# Patient Record
Sex: FEMALE | Race: OTHER | URBAN - METROPOLITAN AREA
[De-identification: names, ages, dates, MRNs, and addresses within clinical notes are randomized per-mention and may not be internally consistent; named-entity substitution may affect disease eponyms.]

---

## 2018-07-19 ENCOUNTER — OUTPATIENT (OUTPATIENT)
Dept: OUTPATIENT SERVICES | Facility: HOSPITAL | Age: 23
LOS: 1 days | Discharge: HOME | End: 2018-07-19

## 2018-07-19 DIAGNOSIS — Z00.00 ENCOUNTER FOR GENERAL ADULT MEDICAL EXAMINATION WITHOUT ABNORMAL FINDINGS: ICD-10-CM

## 2018-07-19 DIAGNOSIS — R19.7 DIARRHEA, UNSPECIFIED: ICD-10-CM

## 2020-05-08 ENCOUNTER — APPOINTMENT (OUTPATIENT)
Dept: ULTRASOUND IMAGING | Age: 25
End: 2020-05-08
Attending: EMERGENCY MEDICINE
Payer: OTHER GOVERNMENT

## 2020-05-08 ENCOUNTER — HOSPITAL ENCOUNTER (EMERGENCY)
Age: 25
Discharge: HOME | End: 2020-05-08
Attending: EMERGENCY MEDICINE
Payer: OTHER GOVERNMENT

## 2020-05-08 VITALS
DIASTOLIC BLOOD PRESSURE: 79 MMHG | RESPIRATION RATE: 14 BRPM | SYSTOLIC BLOOD PRESSURE: 126 MMHG | OXYGEN SATURATION: 99 % | HEART RATE: 67 BPM | HEIGHT: 62 IN | BODY MASS INDEX: 27.6 KG/M2 | WEIGHT: 150 LBS | TEMPERATURE: 98.1 F

## 2020-05-08 DIAGNOSIS — Z34.90 EARLY STAGE OF PREGNANCY: Primary | ICD-10-CM

## 2020-05-08 DIAGNOSIS — R10.84 ABDOMINAL PAIN, GENERALIZED: ICD-10-CM

## 2020-05-08 LAB
ABO + RH BLD: NORMAL
ALBUMIN SERPL-MCNC: 4.8 G/DL (ref 3.4–5)
ALBUMIN/GLOB SERPL: 1.2 {RATIO} (ref 0.8–1.7)
ALP SERPL-CCNC: 51 U/L (ref 45–117)
ALT SERPL-CCNC: 18 U/L (ref 13–56)
ANION GAP SERPL CALC-SCNC: 5 MMOL/L (ref 3–18)
APPEARANCE UR: CLEAR
AST SERPL-CCNC: 16 U/L (ref 10–38)
BACTERIA URNS QL MICRO: ABNORMAL /HPF
BASOPHILS # BLD: 0 K/UL (ref 0–0.1)
BASOPHILS NFR BLD: 1 % (ref 0–2)
BILIRUB SERPL-MCNC: 0.8 MG/DL (ref 0.2–1)
BILIRUB UR QL: NEGATIVE
BUN SERPL-MCNC: 9 MG/DL (ref 7–18)
BUN/CREAT SERPL: 12 (ref 12–20)
CALCIUM SERPL-MCNC: 9.2 MG/DL (ref 8.5–10.1)
CHLORIDE SERPL-SCNC: 106 MMOL/L (ref 100–111)
CO2 SERPL-SCNC: 25 MMOL/L (ref 21–32)
COLOR UR: ABNORMAL
CREAT SERPL-MCNC: 0.74 MG/DL (ref 0.6–1.3)
DIFFERENTIAL METHOD BLD: ABNORMAL
EOSINOPHIL # BLD: 0.1 K/UL (ref 0–0.4)
EOSINOPHIL NFR BLD: 1 % (ref 0–5)
EPITH CASTS URNS QL MICRO: ABNORMAL /LPF (ref 0–5)
ERYTHROCYTE [DISTWIDTH] IN BLOOD BY AUTOMATED COUNT: 13.1 % (ref 11.6–14.5)
GLOBULIN SER CALC-MCNC: 4 G/DL (ref 2–4)
GLUCOSE SERPL-MCNC: 82 MG/DL (ref 74–99)
GLUCOSE UR STRIP.AUTO-MCNC: NEGATIVE MG/DL
HCG SERPL-ACNC: 1965 MIU/ML (ref 0–10)
HCT VFR BLD AUTO: 38.4 % (ref 35–45)
HGB BLD-MCNC: 12.3 G/DL (ref 12–16)
HGB UR QL STRIP: NEGATIVE
KETONES UR QL STRIP.AUTO: 15 MG/DL
LEUKOCYTE ESTERASE UR QL STRIP.AUTO: NEGATIVE
LYMPHOCYTES # BLD: 2.5 K/UL (ref 0.9–3.6)
LYMPHOCYTES NFR BLD: 47 % (ref 21–52)
MCH RBC QN AUTO: 26.9 PG (ref 24–34)
MCHC RBC AUTO-ENTMCNC: 32 G/DL (ref 31–37)
MCV RBC AUTO: 83.8 FL (ref 74–97)
MONOCYTES # BLD: 0.3 K/UL (ref 0.05–1.2)
MONOCYTES NFR BLD: 6 % (ref 3–10)
MUCOUS THREADS URNS QL MICRO: ABNORMAL /LPF
NEUTS SEG # BLD: 2.3 K/UL (ref 1.8–8)
NEUTS SEG NFR BLD: 45 % (ref 40–73)
NITRITE UR QL STRIP.AUTO: NEGATIVE
PH UR STRIP: 5.5 [PH] (ref 5–8)
PLATELET # BLD AUTO: 261 K/UL (ref 135–420)
PMV BLD AUTO: 12.9 FL (ref 9.2–11.8)
POTASSIUM SERPL-SCNC: 3.5 MMOL/L (ref 3.5–5.5)
PROT SERPL-MCNC: 8.8 G/DL (ref 6.4–8.2)
PROT UR STRIP-MCNC: 30 MG/DL
RBC # BLD AUTO: 4.58 M/UL (ref 4.2–5.3)
RBC #/AREA URNS HPF: NEGATIVE /HPF (ref 0–5)
SODIUM SERPL-SCNC: 136 MMOL/L (ref 136–145)
SP GR UR REFRACTOMETRY: >1.03 (ref 1–1.03)
UROBILINOGEN UR QL STRIP.AUTO: 1 EU/DL (ref 0.2–1)
WBC # BLD AUTO: 5.2 K/UL (ref 4.6–13.2)
WBC URNS QL MICRO: NEGATIVE /HPF (ref 0–4)

## 2020-05-08 PROCEDURE — 80053 COMPREHEN METABOLIC PANEL: CPT

## 2020-05-08 PROCEDURE — 86900 BLOOD TYPING SEROLOGIC ABO: CPT

## 2020-05-08 PROCEDURE — 99284 EMERGENCY DEPT VISIT MOD MDM: CPT

## 2020-05-08 PROCEDURE — 76817 TRANSVAGINAL US OBSTETRIC: CPT

## 2020-05-08 PROCEDURE — 81001 URINALYSIS AUTO W/SCOPE: CPT

## 2020-05-08 PROCEDURE — 74011250636 HC RX REV CODE- 250/636: Performed by: EMERGENCY MEDICINE

## 2020-05-08 PROCEDURE — 85025 COMPLETE CBC W/AUTO DIFF WBC: CPT

## 2020-05-08 PROCEDURE — 84702 CHORIONIC GONADOTROPIN TEST: CPT

## 2020-05-08 RX ADMIN — SODIUM CHLORIDE 1000 ML: 900 INJECTION, SOLUTION INTRAVENOUS at 22:01

## 2020-05-08 RX ADMIN — SODIUM CHLORIDE 1000 ML: 900 INJECTION, SOLUTION INTRAVENOUS at 19:50

## 2020-05-08 NOTE — ED NOTES
Assumed care of pt. Pt c/o lower abd cramping x 1 month, states she is approx 4 wks pregnant, had pregnancy confirmed via blood draw at urgent care, has not yet seen OB or had IUP. Pt states constant N/V, not just in the mornings. Did not have this issue with previous pregnancy. States clear thick discharge with no smell or itching, denies vaginal bleeding.

## 2020-05-08 NOTE — ED PROVIDER NOTES
[address] [facility]  EMERGENCY DEPARTMENT HISTORY AND PHYSICAL EXAM       Date: 5/8/2020   Patient Name: Elissa Marcelino   YOB: 1995  Medical Record Number: [id number]    HISTORY OF PRESENTING ILLNESS:     Elissa Marcelino is a 30 y.o. female presenting with the noted PMH to the ED c/o abdominal pain. Patient states it started a week ago. Constant. Periumbilical.  With no increasing or decreasing factors. She states she found out 2 weeks ago she was pregnant. Last menstrual period is April 1. She states she was pregnant with twins 4 years ago. No problems with that pregnancy. She denies any vaginal bleeding or discharge. She reports nausea but no vomiting. Denies any fevers or chills. Denies any cough or cold symptoms. Denies any chest pain or shortness of breath. Denies any urinary changes. She reports having 2 bowel movements daily. Rest of 10 systems reviewed and negative. Primary Care Provider: Joann Platt, Not On File   Specialist:    Past Medical History:   History reviewed. No pertinent past medical history. Past Surgical History:   History reviewed. No pertinent surgical history. Social History:   Social History     Tobacco Use    Smoking status: Never Smoker    Smokeless tobacco: Never Used   Substance Use Topics    Alcohol use: Not on file    Drug use: Not on file        Allergies:   No Known Allergies     REVIEW OF SYSTEMS:  Review of Systems      PHYSICAL EXAM:  Vitals:    05/08/20 1903   BP: 126/79   Pulse: 67   Resp: 14   Temp: 98.1 °F (36.7 °C)   SpO2: 99%   Weight: 68 kg (150 lb)   Height: 5' 2\" (1.575 m)       Physical Exam   Vital signs reviewed. Alert oriented x 3 in NAD. HEENT: normocephalic atraumatic. Eyes are PERRLA EOMI. Conjunctiva normal.    External ears and nose normal.    Neck: normal external exam. No midline neck or back TTP. Lungs are clear to ascultation bilaterally.   normal effort  Heart is regular rate and rhythm with no murmurs. Abdomen soft and nontender. No rebound rigidity or guarding. Extremities: Moves all 4 extremities and no distress. Full range of motion. 2+ pulses and BCR in all 4 extremities. Neuro: Normal gait. 5 out of 5 strength in all 4 extremities. No facial droop. Skin examination: intact. no rashes. No petechia or purpura. Medications   sodium chloride 0.9 % bolus infusion 1,000 mL (has no administration in time range)   sodium chloride 0.9 % bolus infusion 1,000 mL (1,000 mL IntraVENous New Bag 5/8/20 1950)       RESULTS:    Labs -   Labs Reviewed   CBC WITH AUTOMATED DIFF - Abnormal; Notable for the following components:       Result Value    MPV 12.9 (*)     All other components within normal limits   BETA HCG, QT - Abnormal; Notable for the following components:    Beta HCG, QT 1,965 (*)     All other components within normal limits   METABOLIC PANEL, COMPREHENSIVE - Abnormal; Notable for the following components:    Protein, total 8.8 (*)     All other components within normal limits   URINALYSIS W/ RFLX MICROSCOPIC - Abnormal; Notable for the following components:    Specific gravity >1.030 (*)     Protein 30 (*)     Ketone 15 (*)     All other components within normal limits   URINE MICROSCOPIC ONLY - Abnormal; Notable for the following components:    Bacteria 1+ (*)     Mucus 2+ (*)     All other components within normal limits   BLOOD TYPE, (ABO+RH)       Radiologic Studies -  No results found. MEDICAL DECISION MAKING    2030. Patient reassessed. Currently in ultrasound.  2101. Patient reassessed. Still in ultrasound. Patient has no new complaints, changes, or physical findings. Results were reviewed with the patient. Pt's questions and concerns were addressed. Care plan was outlined, including follow-up with PCP/specialist and return precautions were discussed. Patient is felt to be stable for discharge at this time. Diagnosis   Clinical Impression:   1.  Early stage of pregnancy    2. Abdominal pain, generalized           Currently  in stable and improved condition. This chart was completed using Dragon, a dictation transcription service. Errors may have resulted from using this device. 2158. Patient signed up with Dr. Thaddeus Saavedra. Currently waiting for ultrasound read. Patient states she only drank a little bit of tea today. Discussed with her about increasing her p.o. intake. She is very dehydrated. Reason for the specific gravity and protein in her urine. Ultrasound still pending. Patient aware. Dr. Thaddeus Saavedra will follow-up.

## 2020-05-08 NOTE — ED TRIAGE NOTES
4 weeks pregnant, verified by bloodwork at [facility]. Called EMS for c/o abdominal pain. Denies bleeding.

## 2020-05-09 NOTE — ED NOTES
10:36 PM signed out to me at 10 PM signout, patient with early pregnancy nonspecific abdominal pain benign labs nausea and avoiding food and drink, was treated for dehydration. Pending is her ultrasound to localize the pregnancy. Ultrasound read by Dr. Enrico Kirby, intrauterine saclike structure with no yolk sac or fetal pole  Right ovarian hypodensity with peripheral vascularity likely representing a right corpus luteum  Trace amount of nonspecific free fluid in the right adnexa    I discussed the findings with the patient, follow-up beta-hCG in 48 hours either through OB or here. Discussed presence of gestational sac, does not define the location of the pregnancy and she will require follow-up ultrasound to definitively localize the pregnancy. She is happy with this plan. Benign and nontoxic appearance and no abdominal tenderness when I examined her. Encounter Diagnoses     ICD-10-CM ICD-9-CM   1. Early stage of pregnancy Z34.90 V22.2   2.  Abdominal pain, generalized R10.84 789.07

## 2020-05-09 NOTE — DISCHARGE INSTRUCTIONS
Patient Education     Belly Pain in Pregnancy: Care Instructions  Your Care Instructions  When you're pregnant, any belly pain can be a worry. You may not want to call your doctor about every pain you have. But you don't want to miss something that is dangerous for you or your baby. Even if it feels familiar, belly pain can mean something new when you're pregnant. It's important to know when to call your doctor. It will also help to know how to care for yourself at home when your pain is not caused by anything harmful. · When belly pain is more severe or constant, see a doctor right away. · If you're sure your belly pain is a sign of labor, call your doctor. · When belly pain is brief, it's usually a normal part of pregnancy. It might be related to changes in the growing uterus. Or it could be the stretching of ligaments called round ligaments. These ligaments help support the uterus. Round ligament pain can be on either side of your belly. It can also be felt in your hips or groin. Follow-up care is a key part of your treatment and safety. Be sure to make and go to all appointments, and call your doctor if you are having problems. It's also a good idea to know your test results and keep a list of the medicines you take. How can you tell if belly pain is a sign of labor? When belly pain is caused by labor, it can feel like mild or menstrual-like cramps in your lower belly. These cramps are probably contractions. They can happen in your second or third trimester. You may also have:  · A steady, dull ache in your lower back, pelvis, or thighs. · A feeling of pressure in your pelvis or lower belly. · Changes in your vaginal discharge or a sudden release of fluid from the vagina. If you think you are in labor, call your doctor. How can you care for yourself at home? When belly pain is mild and is not a symptom of labor:  · Rest until you feel better. · Take a warm bath.   · Think about what you drink and eat:  ¨ Drink plenty of fluids. Choose water and other caffeine-free clear liquids until you feel better. ¨ Try eating small, frequent meals. If your stomach is upset, try bland, low-fat foods like plain rice, broiled chicken, toast, and yogurt. · Think about how you move if you are having brief pains from stretching of the round ligaments. ¨ Try gentle stretching. ¨ Move a little more slowly when turning in bed or getting up from a chair, so those ligaments don't stretch quickly. ¨ Lean forward a bit if you think you are going to cough or sneeze. When should you call for help? Call 911 anytime you think you may need emergency care. For example, call if:  · You have sudden, severe pain in your belly. · You have severe vaginal bleeding. Call your doctor now or seek immediate medical care if:  · You have new or worse belly pain or cramping. · You have any vaginal bleeding. · You have a fever. · You have symptoms of preeclampsia, such as:  ¨ Sudden swelling of your face, hands, or feet. ¨ New vision problems (such as dimness or blurring). ¨ A severe headache. · You think that you may be in labor. This means that you've had at least 8 contractions within 1 hour or at least 4 contractions within 20 minutes, even after you change your position and drink fluids. · You have symptoms of a urinary tract infection. These may include:  ¨ Pain or burning when you urinate. ¨ A frequent need to urinate without being able to pass much urine. ¨ Pain in the flank, which is just below the rib cage and above the waist on either side of the back. ¨ Blood in your urine. Watch closely for changes in your health, and be sure to contact your doctor if you are worried about your or your baby's health. Where can you learn more? Go to [employer]  Enter B275 in the search box to learn more about \"Belly Pain in Pregnancy: Care Instructions. \"   © 0723-7584 [employer], [employer].  Care instructions adapted under license by [facility] (which disclaims liability or warranty for this information). This care instruction is for use with your licensed healthcare professional. If you have questions about a medical condition or this instruction, always ask your healthcare professional. [address] any warranty or liability for your use of this information.   Content Version: 04.5.929695; Current as of: November 12, 2015

## 2020-05-21 ENCOUNTER — HOSPITAL ENCOUNTER (EMERGENCY)
Age: 25
Discharge: PSYCHIATRIC OR SUBSTANCE USE FACILITY | End: 2020-05-21
Attending: EMERGENCY MEDICINE
Payer: OTHER GOVERNMENT

## 2020-05-21 ENCOUNTER — HOSPITAL ENCOUNTER (INPATIENT)
Age: 25
LOS: 5 days | Discharge: HOME | DRG: 885 | End: 2020-05-26
Attending: PSYCHIATRY & NEUROLOGY | Admitting: PSYCHIATRY & NEUROLOGY
Payer: OTHER GOVERNMENT

## 2020-05-21 VITALS
DIASTOLIC BLOOD PRESSURE: 75 MMHG | WEIGHT: 150 LBS | HEART RATE: 95 BPM | RESPIRATION RATE: 16 BRPM | BODY MASS INDEX: 27.6 KG/M2 | SYSTOLIC BLOOD PRESSURE: 127 MMHG | TEMPERATURE: 98.6 F | OXYGEN SATURATION: 98 % | HEIGHT: 62 IN

## 2020-05-21 DIAGNOSIS — R45.851 SUICIDAL IDEATION: Primary | ICD-10-CM

## 2020-05-21 DIAGNOSIS — F32.A DEPRESSION, UNSPECIFIED DEPRESSION TYPE: ICD-10-CM

## 2020-05-21 LAB
ALBUMIN SERPL-MCNC: 3.9 G/DL (ref 3.4–5)
ALBUMIN/GLOB SERPL: 1 {RATIO} (ref 0.8–1.7)
ALP SERPL-CCNC: 53 U/L (ref 45–117)
ALT SERPL-CCNC: 18 U/L (ref 13–56)
AMPHET UR QL SCN: NEGATIVE
ANION GAP SERPL CALC-SCNC: 3 MMOL/L (ref 3–18)
APAP SERPL-MCNC: <2 UG/ML (ref 10–30)
AST SERPL-CCNC: 16 U/L (ref 10–38)
BARBITURATES UR QL SCN: NEGATIVE
BASOPHILS # BLD: 0 K/UL (ref 0–0.1)
BASOPHILS NFR BLD: 1 % (ref 0–2)
BENZODIAZ UR QL: NEGATIVE
BILIRUB SERPL-MCNC: 0.5 MG/DL (ref 0.2–1)
BUN SERPL-MCNC: 8 MG/DL (ref 7–18)
BUN/CREAT SERPL: 11 (ref 12–20)
CALCIUM SERPL-MCNC: 9.1 MG/DL (ref 8.5–10.1)
CANNABINOIDS UR QL SCN: POSITIVE
CHLORIDE SERPL-SCNC: 107 MMOL/L (ref 100–111)
CO2 SERPL-SCNC: 27 MMOL/L (ref 21–32)
COCAINE UR QL SCN: NEGATIVE
CREAT SERPL-MCNC: 0.75 MG/DL (ref 0.6–1.3)
DIFFERENTIAL METHOD BLD: ABNORMAL
EOSINOPHIL # BLD: 0.1 K/UL (ref 0–0.4)
EOSINOPHIL NFR BLD: 2 % (ref 0–5)
ERYTHROCYTE [DISTWIDTH] IN BLOOD BY AUTOMATED COUNT: 13.3 % (ref 11.6–14.5)
ETHANOL SERPL-MCNC: <3 MG/DL (ref 0–3)
GLOBULIN SER CALC-MCNC: 4.1 G/DL (ref 2–4)
GLUCOSE SERPL-MCNC: 86 MG/DL (ref 74–99)
HCG SERPL QL: POSITIVE
HCG SERPL-ACNC: 500 MIU/ML (ref 0–10)
HCT VFR BLD AUTO: 35.1 % (ref 35–45)
HDSCOM,HDSCOM: ABNORMAL
HGB BLD-MCNC: 11.3 G/DL (ref 12–16)
LYMPHOCYTES # BLD: 1.9 K/UL (ref 0.9–3.6)
LYMPHOCYTES NFR BLD: 47 % (ref 21–52)
MCH RBC QN AUTO: 27.2 PG (ref 24–34)
MCHC RBC AUTO-ENTMCNC: 32.2 G/DL (ref 31–37)
MCV RBC AUTO: 84.6 FL (ref 74–97)
METHADONE UR QL: NEGATIVE
MONOCYTES # BLD: 0.6 K/UL (ref 0.05–1.2)
MONOCYTES NFR BLD: 14 % (ref 3–10)
NEUTS SEG # BLD: 1.5 K/UL (ref 1.8–8)
NEUTS SEG NFR BLD: 36 % (ref 40–73)
OPIATES UR QL: NEGATIVE
PCP UR QL: NEGATIVE
PLATELET # BLD AUTO: 226 K/UL (ref 135–420)
PMV BLD AUTO: 13.3 FL (ref 9.2–11.8)
POTASSIUM SERPL-SCNC: 3.7 MMOL/L (ref 3.5–5.5)
PROT SERPL-MCNC: 8 G/DL (ref 6.4–8.2)
RBC # BLD AUTO: 4.15 M/UL (ref 4.2–5.3)
SALICYLATES SERPL-MCNC: <1.7 MG/DL (ref 2.8–20)
SODIUM SERPL-SCNC: 137 MMOL/L (ref 136–145)
WBC # BLD AUTO: 4.1 K/UL (ref 4.6–13.2)

## 2020-05-21 PROCEDURE — 84703 CHORIONIC GONADOTROPIN ASSAY: CPT

## 2020-05-21 PROCEDURE — 80053 COMPREHEN METABOLIC PANEL: CPT

## 2020-05-21 PROCEDURE — 99285 EMERGENCY DEPT VISIT HI MDM: CPT

## 2020-05-21 PROCEDURE — 85025 COMPLETE CBC W/AUTO DIFF WBC: CPT

## 2020-05-21 PROCEDURE — 80307 DRUG TEST PRSMV CHEM ANLYZR: CPT

## 2020-05-21 PROCEDURE — 84702 CHORIONIC GONADOTROPIN TEST: CPT

## 2020-05-21 PROCEDURE — 87635 SARS-COV-2 COVID-19 AMP PRB: CPT

## 2020-05-21 PROCEDURE — 65220000003 HC RM SEMIPRIVATE PSYCH

## 2020-05-21 RX ORDER — BENZTROPINE MESYLATE 1 MG/ML
1 INJECTION INTRAMUSCULAR; INTRAVENOUS
Status: DISCONTINUED | OUTPATIENT
Start: 2020-05-21 | End: 2020-05-26 | Stop reason: HOSPADM

## 2020-05-21 RX ORDER — HALOPERIDOL 5 MG/1
5 TABLET ORAL
Status: DISCONTINUED | OUTPATIENT
Start: 2020-05-21 | End: 2020-05-26 | Stop reason: HOSPADM

## 2020-05-21 RX ORDER — IBUPROFEN 600 MG/1
600 TABLET ORAL
Status: DISCONTINUED | OUTPATIENT
Start: 2020-05-21 | End: 2020-05-26 | Stop reason: HOSPADM

## 2020-05-21 RX ORDER — LORAZEPAM 2 MG/ML
1 INJECTION INTRAMUSCULAR
Status: DISCONTINUED | OUTPATIENT
Start: 2020-05-21 | End: 2020-05-26 | Stop reason: HOSPADM

## 2020-05-21 RX ORDER — HALOPERIDOL 5 MG/ML
5 INJECTION INTRAMUSCULAR
Status: DISCONTINUED | OUTPATIENT
Start: 2020-05-21 | End: 2020-05-26 | Stop reason: HOSPADM

## 2020-05-21 RX ORDER — LORAZEPAM 1 MG/1
1 TABLET ORAL
Status: DISCONTINUED | OUTPATIENT
Start: 2020-05-21 | End: 2020-05-26 | Stop reason: HOSPADM

## 2020-05-21 RX ORDER — TRAZODONE HYDROCHLORIDE 50 MG/1
50 TABLET ORAL
Status: DISCONTINUED | OUTPATIENT
Start: 2020-05-21 | End: 2020-05-26 | Stop reason: HOSPADM

## 2020-05-21 RX ORDER — BENZTROPINE MESYLATE 1 MG/1
1 TABLET ORAL
Status: DISCONTINUED | OUTPATIENT
Start: 2020-05-21 | End: 2020-05-26 | Stop reason: HOSPADM

## 2020-05-21 NOTE — CONSULTS
This evaluation was conducted via telepsychiatry with the assistance of onsite staff. NAME: Shama Levy  : 1995  DATE: [id number]  TIME:   Location of Patient: [facility] ED  Location of Physician: [facility]    Chief Complaint: Pt with SI and attempt to cut wrists yesterday (stopped by ) in the context of recent . History of Present Illness:  Pt is a 24y/o F with depression since 2016 (birth of twin boys). States that from immediately after her twins were born she felt numb and disconnected. Has felt depressed off and on since with intermittent suicidal ideation (has not acted on it until yesterday). States she started seeing a therapist last year in [place] and felt like it was helping a bit but then moved to [place] and then with quarantine was spending much more time at home with kids and depression noticeably worsened. Also had a recent  and an acute worsening of symptoms as well as panic attacks. Suicidal thoughts much worse and attempted to cut wrist but was stopped by . Endorses depressive symptoms to include difficulty sleeping, anhedonia, guilt, dec energy, diff concentrating, dec appetite, and suicidal thoughts. Also endorses paranoid thoughts about  and friends that she knows aren't true but feels it is a struggle between part of her brain that knows they aren't true and the part having the paranoid thoughts - thoughts include paranoia about infidelity or plans to take away kids on part of  as well as talking behind her back and not really liking her on part of friends. Also endorses hearing voices intermittently - derogatory single voice inside her head telling her to hurt herself. Sleep - tired during the day. Difficulty sleeping at night. Difficulty falling asleep/staying asleep until 4am.  Gets up at 7am with kids. Prior to kids would get 8 hours.   No treatment. Sources of Info: Chart Review, discussion with ED staff, and pt interview  SI/Self-harm: SI with plan to cut herself - attempted yesterday but stopped by . High risk per CSSRS  HI/Violence: denies  Access to weapons: denies  Legal: denies  Psychiatric Hx: denies  Drugs/Alcohol Hx: occasional alcohol. Couple glasses of wine a month. Denies illicits. No tobacco.  Medical Hx:  None  Medications: none  Allergies: NKDA  Family Psych Hx/History of suicides: none  Social Hx:      Employment: yes. Works as virtual customer service. A couple weeks. Living Situation: live at home with  and two kids  Mental Status Exam:     Appearance/Attire: hospital gown in hospital bed    Attitude/Behavior: depressed appearing    Speech: soft and slow speech    Mood: \"really depressed\"    Affect: mood congruent    Thought Process/Perceptions/Associations: hearing voices, doesn't recognize. Voices telling her to hurt. Hears voices frequently. Derogatory. Since kids in 2016. One voice inside her head. Thought Content: +SI with plan    Memory/Orientation:  Grossly intact    Insight/judgment: fair    Impression/Risk Assessment: 26y/o F with several year history of likely post-partum depression partially treated with therapy who presents with rapid and acute exacerbation of depression resulting in suicidal thought and action over past few days, has psychotic features, CSSRS high risk, recent move so limited local social supports and not plugged in to mental health locally. Pt states she wants help and is voluntary for admission. Plan - admit to psych. Diagnosis: Depression with psychotic features (MDD v post-partum)    Treatment Recommendations:   1) Admit to psych  2) Recommend sleep aid if patient remains in ED overnight. Would recommend trazodone 50mg prn insomnia.   3) Interpersonal therapy and CBT; possible grief oriented therapy given recent loss  4) Would defer initiation of anti-depressant to inpatient team    Level of care: Inpatient    Pt is currently voluntary for admission but if patient rescinds voluntary status prior to admission please reconsult psychiatry prior to dispo as patient is a candidate for involuntary hospitalization.

## 2020-05-21 NOTE — ED PROVIDER NOTES
EMERGENCY DEPARTMENT HISTORY AND PHYSICAL EXAM    Date: 2020  Patient Name: Elissa Marcelino    History of Presenting Illness     No chief complaint on file. History Provided By: Patient    Additional History (Context): Elissa Marcelino is a 30 y.o. female with depression who presents with SI thoughts. Feels very depressed; had an  last week which has exacerbated her mood swings. Attempted to cut herself w/knife yesterday but  stopped her. Denies ingestants. Denies tobacco, ETOH. Says she went to therapy last year; denies any oral therapy. Reports minimal pelvic discomfort or bleeding. PCP: Joann Platt, Not On File        Past History     Past Medical History:  No past medical history on file. Past Surgical History:  No past surgical history on file. Family History:  No family history on file. Social History:  Social History     Tobacco Use    Smoking status: Never Smoker    Smokeless tobacco: Never Used   Substance Use Topics    Alcohol use: Not on file    Drug use: Not on file       Allergies:  No Known Allergies      Review of Systems   Review of Systems   Constitutional: Negative. HENT: Negative. Eyes: Negative. Respiratory: Negative. Cardiovascular: Negative. Gastrointestinal: Negative. Endocrine: Negative. Genitourinary: Positive for vaginal bleeding. Musculoskeletal: Negative. Skin: Negative. Allergic/Immunologic: Negative. Neurological: Negative. Hematological: Negative. Psychiatric/Behavioral: Positive for dysphoric mood, self-injury and suicidal ideas. All Other Systems Negative  Physical Exam     Vitals:    20 1618   BP: 127/75   Pulse: 95   Resp: 16   Temp: 98.6 °F (37 °C)   SpO2: 98%   Weight: 68 kg (150 lb)   Height: 5' 2\" (1.575 m)     Physical Exam  Vitals signs and nursing note reviewed. Constitutional:       Appearance: She is well-developed. HENT:      Head: Normocephalic and atraumatic.    Eyes: Pupils: Pupils are equal, round, and reactive to light. Neck:      Thyroid: No thyromegaly. Vascular: No JVD. Trachea: No tracheal deviation. Cardiovascular:      Rate and Rhythm: Normal rate and regular rhythm. Heart sounds: Normal heart sounds. No murmur. No friction rub. No gallop. Pulmonary:      Effort: Pulmonary effort is normal. No respiratory distress. Breath sounds: Normal breath sounds. No stridor. No wheezing or rales. Chest:      Chest wall: No tenderness. Abdominal:      General: There is no distension. Palpations: Abdomen is soft. There is no mass. Tenderness: There is no abdominal tenderness. There is no guarding or rebound. Musculoskeletal:         General: No tenderness. Lymphadenopathy:      Cervical: No cervical adenopathy. Skin:     General: Skin is warm and dry. Coloration: Skin is not pale. Findings: No erythema or rash. Neurological:      Mental Status: She is alert and oriented to person, place, and time. Psychiatric:         Behavior: Behavior normal.         Thought Content: Thought content normal.            Diagnostic Study Results     Labs -   No results found for this or any previous visit (from the past 12 hour(s)). Radiologic Studies -   No orders to display     CT Results  (Last 48 hours)    None        CXR Results  (Last 48 hours)    None            Medical Decision Making   I am the first provider for this patient. I reviewed the vital signs, available nursing notes, past medical history, past surgical history, family history and social history. Vital Signs-Reviewed the patient's vital signs. Procedures:  Procedures    Provider Notes (Medical Decision Making): clear medically and have her evaluated by crisis. 6:36 PM  Medically cleared; tele-psychiatry paged. 8:51 PM  Patient has been accepted over at [facility] by Dr. Chris Mcwilliams with the plan to admit to Dr. Sharon Lee.     MED RECONCILIATION:  No current facility-administered medications for this encounter. No current outpatient medications on file. Disposition:  transfer    Follow-up Information    None         There are no discharge medications for this patient. Clinical Impression: No diagnosis found.

## 2020-05-21 NOTE — ED TRIAGE NOTES
Patient states she has been depressed since the birth of her twins 4 years ago, states she had an  on Friday and is now more depressed, tried to use a knife on herself yesterday, but  stopped her, denies HI

## 2020-05-22 PROBLEM — F33.3 SEVERE RECURRENT MAJOR DEPRESSIVE DISORDER WITH PSYCHOTIC FEATURES (HCC): Status: ACTIVE | Noted: 2020-05-21

## 2020-05-22 LAB
COVID-19 RAPID TEST, COVR: NOT DETECTED
SOURCE, COVRS: NORMAL

## 2020-05-22 PROCEDURE — 65220000003 HC RM SEMIPRIVATE PSYCH

## 2020-05-22 PROCEDURE — 74011250637 HC RX REV CODE- 250/637: Performed by: PSYCHIATRY & NEUROLOGY

## 2020-05-22 RX ORDER — ESCITALOPRAM OXALATE 10 MG/1
10 TABLET ORAL DAILY
Status: DISCONTINUED | OUTPATIENT
Start: 2020-05-22 | End: 2020-05-26 | Stop reason: HOSPADM

## 2020-05-22 RX ORDER — ARIPIPRAZOLE 2 MG/1
2 TABLET ORAL DAILY
Status: DISCONTINUED | OUTPATIENT
Start: 2020-05-23 | End: 2020-05-24

## 2020-05-22 RX ADMIN — ESCITALOPRAM OXALATE 10 MG: 10 TABLET ORAL at 11:58

## 2020-05-22 NOTE — H&P
Psychiatry History and Physical    Subjective:     Date of Evaluation:  2020    Reason for Referral:  Shena Garcia was referred to the examiners from ER[facility] for SI with plan to cut her wrist.    History of Presenting Problem: 24y/o AA female in NAD well developed and nourished. First time admit to [facility] Psych unit. Tox -screen positive THC. HCG+ -recently had . Covid test -negative. Admits to sleep issues and depression. Denies SI,HI,AH,VH. No physical complaints today. Patient Active Problem List    Diagnosis Date Noted    Severe recurrent major depressive disorder with psychotic features ([facility] [address] 2020     No past medical history on file. No past surgical history on file. No family history on file.    Social History     Tobacco Use    Smoking status: Never Smoker    Smokeless tobacco: Never Used   Substance Use Topics    Alcohol use: Yes     Comment: occ     Prior to Admission medications    Not on File     No Known Allergies     Review of Systems - History obtained from chart review and the patient  General ROS: negative  Psychological ROS: positive for - depression and sleep disturbances  Ophthalmic ROS: negative  ENT ROS: negative  Respiratory ROS: no cough, shortness of breath, or wheezing  Cardiovascular ROS: no chest pain or dyspnea on exertion  Gastrointestinal ROS: no abdominal pain, change in bowel habits, or black or bloody stools  Genito-Urinary ROS: no dysuria, trouble voiding, or hematuria  Musculoskeletal ROS: negative  Neurological ROS: no TIA or stroke symptoms  Dermatological ROS: negative      Objective:     Patient Vitals for the past 8 hrs:   BP Temp Pulse Weight   20 0846 104/62 97.2 °F (36.2 °C) 91    20 0801    68 kg (150 lb)       Mental Status exam: WNL except for    Sensorium  oriented to time, place and person   Orientation person, place, time/date and situation   Relations cooperative   Eye Contact appropriate   Appearance:  age appropriate and within normal Limits   Motor Behavior:  gait stable and within normal limits   Speech:  soft   Vocabulary average   Thought Process: logical   Thought Content free of delusions and free of hallucinations   Suicidal ideations no plan  and no intention   Homicidal ideations no plan  and no intention   Mood:  depressed and sad   Affect:  depressed and sad   Memory recent  adequate   Memory remote:  adequate   Concentration:  adequate   Abstraction:  concrete   Insight:  fair   Reliability fair   Judgment:  fair         Physical Exam:   Visit Vitals  /62 (BP 1 Location: Right arm, BP Patient Position: Sitting)   Pulse 91   Temp 97.2 °F (36.2 °C)   Resp 16   Wt 68 kg (150 lb)   LMP 2020 Comment: recent     BMI 27.44 kg/m²     General appearance: alert, cooperative, no distress, appears stated age  Head: Normocephalic, without obvious abnormality, atraumatic  Eyes: negative  Ears: normal TM's and external ear canals AU  Nose: Nares normal. Septum midline. Mucosa normal. No drainage or sinus tenderness. Throat: Lips, mucosa, and tongue normal. Teeth and gums normal  Neck: supple, symmetrical, trachea midline, no adenopathy, thyroid: not enlarged, symmetric, no tenderness/mass/nodules, no carotid bruit and no JVD  Back: symmetric, no curvature. ROM normal. No CVA tenderness. Lungs: clear to auscultation bilaterally  Heart: regular rate and rhythm, S1, S2 normal, no murmur, click, rub or gallop  Abdomen: soft, non-tender.  Bowel sounds normal. No masses,  no organomegaly  Extremities: extremities normal, atraumatic, no cyanosis or edema  Pulses: 2+ and symmetric  Skin: Skin color, texture, turgor normal. No rashes or lesions  Lymph nodes: Cervical, supraclavicular, and axillary nodes normal.  Neurologic: Grossly normal        Impression:      Principal Problem:    Severe recurrent major depressive disorder with psychotic features ([facility] [address] (2020)          Plan:     Recommendations for Treatment/Conditions:  Psychiatric treatment recommended while in hospital  Admit to Psych -Dr Lg Burroughs service    Referral To: Inpatient psychiatric care    Competency Statement: At the current time, the patient is competent to make informed consent regarding their current medical care and discharge planning and/or financial decisions.       [employer], [place]   5/22/2020 12:18 PM

## 2020-05-22 NOTE — H&P
[facility] HISTORY AND PHYSICAL    Name:  Lili Sigala  MR#:   [de-identified]  :  1995  ACCOUNT #:  [de-identified]  ADMIT DATE:  2020    IDENTIFYING INFORMATION:  The patient is a 42-year-old female, , admitted to this facility as a direct transfer from [facility] Emergency Department on the above-mentioned date. BASIS FOR ADMISSION:  The patient presented herself to [facility] with a history of being increasingly depressed and suicidal.  Allegedly, her  had been the one who had been able to stop the patient trying to cut her wrists. Depression which is recurrent is in association, the patient believes, with the sense of guilt that she had after having an  last week (this was the second twin pregnancy the patient had), with her describing becoming increasingly helpless, hopeless and again having recurrent suicidal thoughts, these symptoms worsening by the presence of voices which were very derogatory to the patient. The voices had also been telling her, she said (she hears them when she is by herself), that she should harm herself. The patient's case was presented via Telepsych with the telepsychiatrist evaluation being supportive of the patient needing inpatient hospitalization. So, the physician on-call was called with the patient being admitted to the service of the undersigned. PSYCHIATRIC HISTORY:  The patient described a history of being depressed at the time of her twin boys' delivery on or about . She described that the symptoms began almost on the same day in which the twins were delivered and got worse during the first 6-8 weeks postpartum. During the peripartum, she had not been overtly depressed; however, she had begun to change, she said, since in addition to her being pregnant, it appears that at times she was having some problems with her .   The patient and her , by the way,  for a while; however, she decided to come back to this area when she failed to obtain emotional support from her mother and other family and friends of hers. It is to be mentioned that another source of a stress was the fact that the patient was brought to this country undocumented by her mother from [place], who came initially under a working visa. It is not clear as to how later on the patient was able to improve her legal status; however, it appears that now she is a permanent immigrant and is considering the possibility of becoming a [place] [employer]. Her mother is still residing in this country, specifically [place], a place where she is a nanny; however, she \"takes care of only girls and she said that she cannot help me out with my twin boys. \"  It appears that the boys are very healthy, somewhat hyper, with the patient describing also at one time or another since the boys were born of considering the possibility of giving them for adoption. She believed at that time that she was not able to provide her children with what they needed emotionally and physically and so the reason for which she had those type of thoughts, she indicated today. Regardless, there had been a great deal of difficulties present still with her  who appears to be very distant. At the time in which the couple were , the separation occurred due to his alleged involvement with drugs and other women. Eventually, he enlisted in [employer], and apparently, those behaviors had improved to a degree and that is the reason for which she came back to stay with him. It is not clear as to how close the relationship is still at present. The patient also described the presence of auditory hallucinations which had been egodystonic and commanding and at times telling her to harm herself. Prior to her attempting to cut her wrists which is what precipitated her  taking [facility] to [facility], the patient described banging her head against the wall.   She never lost consciousness, and her  eventually stopped her, but again she at times has had these thoughts of harming self. The patient did describe being involved in therapy in the past; however, she never consulted with someone that could prescribe her with antidepressants since she felt that doing so was going to basically \"declare me to be a person that is not a strong enough to deal with things. \"    MEDICAL HISTORY:  The patient was medically cleared at the emergency room. The patient was provided with medications to precipitate the above-mentioned , this happening a week ago. She described very mild abdominal discomfort and she denies any bleeding. LABORATORY DATA:  Multiple labs were performed at the time of the patient's evaluation at the emergency department. This included a CBC with differential that showed very mild anemia with a hemoglobin of 11.3, normal hematocrit of 35.1, white blood cell count is reduced to 4.1 and RBC to 4.15 million/unit liter. Differential basically normal.  Platelet count normal.  A CMP showed sodium 137, potassium 3.7, chloride of 107, blood sugar 86, BUN 8, creatinine 0.75, estimated GFR above 60 mL/min and normal liver function tests. Urine pregnancy test positive. Serum pregnancy test shows 900 micro international units/mL, this being much lower than the one performed on 2020, which was 1965 micro international units/mL. So, obviously the quantity of the serum pregnancy test is being reduced. Acetaminophen and salicylate levels below range. Urine drug screen was negative. Alcohol levels below 3. They also provided a COVID-19 test which came back negative. An ultrasound done on 2020 showed the presence of pregnancy of unknown location with followup recommended.     ALCOHOL AND DRUG HISTORY:  The patient has a negative history of alcohol or use of drugs including illicit drugs, abusing over-the-counter medications and/or abusing prescription medications. She drinks a couple of glasses of wine a month. PERSONAL HISTORY AND FAMILY HISTORY:  As stated before, the patient was brought by her mother, was initially an undocumented person who has been able to obtain the necessary legal status and is now a permanent immigrant. The patient indicated that her parents were  years ago. She has two half-sisters that she knows from her father side of the family and on the mother's side, I believe, two sisters and one brother. The patient and her  have been  for 3 or 4 years. He has enlisted in [employer]. They were  for a while as I have above mentioned; however, it appears that his involvement in drugs and other women has improved since he became involved in the [employer]. He is not planning on making the [employer] a career, he is planning on discharge from the service in 3 years. MENTAL STATUS EXAM:  This is a female patient who looks her stated age. Very intelligent, expressing herself very appropriately, the patient described a history of depression which is multifactorial, but mostly associated to the ending of a twin pregnancy a week prior to admission. This was a very difficult decision for the patient to make, she indicated, and even though her  supported by saying, \"I will support the decision whatever it is,\" she never obtained from him what she wanted, that being his thoughts about her going through the  as stated. During the evaluation, the patient was found to be coherent, showing quality of continuity of associations without evidence of flight of ideas and/or pressure of speech. There has been no evidence of hypomania or maria t in the recent past.  Her depression is characterized by helplessness and hopelessness and recurrent suicidal thoughts, this worsened by the presence of a single voice that tells her that she is \"not good and to kill myself. \"  This is the reason for which prior to admission, she was trying to cut her wrists; however, her  is the one who was able to stop her. The patient did mention upon my request that she will be able to talk to the staff if her suicidal thoughts worsen while she is in our facility. She denies any thoughts of harming anyone else including her twin boys and her . During the evaluation, there is no evidence of cognitive impairment. Insight and judgment appear to be fair with the patient considered to be psychiatrically competent. No evidence of flight of ideas, pressure of speech, ideas of reference or influence. There is no evidence of any other hallucinatory process either. CLINICAL IMPRESSION:  AXIS I:  Major depressive disorder, recurrent, without psychotic symptoms. AXIS II:  Noncontributory. AXIS III:  Status post recent termination of pregnancy by ingestion of oral medications for same. TREATMENT PLAN:  1. The patient was admitted to the adult program.  She will be seen daily and will be referred to the groups within context of the program.  The patient has been placed on suicidal precautions as expected. 2.  The patient has been advised about me not being present this weekend; however, I would be back on Memorial Day when we will see her again; coverage will see her for me this weekend. 3.  From a medication point of view, the patient was advised about beginning treatment with aripiprazole 2 mg every night at bedtime for the purpose of both antidepressant augmentation, the same as helping with her impaired reality testing in addition to prescriptions for escitalopram 10 mg every morning. The patient described lack of energy and feeling tired all the time; however, she does also describe feeling anxious. Lexapro is not the best in regards to helping with anxiety related symptoms; however, I am still hopeful that it is going to be helpful specifically with the patient's depression.   If she requires, prescription for Vistaril has been ordered. 4.  We will be repeating another serum pregnancy test in 3 or 4 days, hoping to see the numbers coming down as stated. ESTIMATED LENGTH OF STAY AND PROGNOSIS:  ELOS is 5-7 days. Prognosis will depend on treatment compliance and treatment response.       Gabriella Cuba MD, LFAPA      [id number]  D:  05/22/2020 12:19  T:  05/22/2020 16:23  JOB #:  [id number]

## 2020-05-22 NOTE — BH NOTES
Patient admitted to [facility] unit for depression with suicidal thoughts on a voluntary status. Patient is currently alert and oriented and able to recall events that led to admission. Doug reported, \"I have been having a lot of emotional ups and downs. Anything can trigger crying or panic attacks. I was attempting to commit suicide, but my  stopped me and took me to the hospital.\"  Patient reported that she has never attempted suicide in the past.  Pt reported that her current stressors are: \"anything too stressful. \"   Patient reported that she does not get much sleep these days. Patient reported that she does not indulge in illicit drug use. Informed patient of positive urine. Patient reported \"my  cooks with it. Marijuana makes me paranoid and anxious. \"  Patient reported that she last ingested marijuana \"a few weeks ago. \"    Patient's current mental status is sad, depressed, and anxious. Patient denied having a medical history and reported a psychiatric history of depression, but does not take medication for it. Patient was oriented to unit and rules. Patient was checked for contraband. Patient placed on suicide precautions where rounds will be monitored every 15 minutes for safety. Patient signed a PATRICK form and  was notified of admission. Opportunity for questions provided. All needs assessed. RN will initiate, develop, implement, review or revise treatment plan.
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
## 2020-05-22 NOTE — GROUP NOTE
[facility] GROUP DOCUMENTATION INDIVIDUAL Group Therapy Note Date: 5/22/2020 Group Start Time: 1831 Group End Time: 1400 Group Topic: Nursing [address] 1 ADULT CHEM DEP Azra Sandoval RN 
 
[facility] GROUP DOCUMENTATION GROUP Group Therapy Note Attendees: 1 Attendance: Did not attend Cristin Gonzalez RN

## 2020-05-22 NOTE — ED NOTES
TRANSFER - OUT REPORT:    Verbal report given to [employer]) on Alicia Laureano  being transferred to [facility] (unit) for routine progression of care       Report consisted of patients Situation, Background, Assessment and   Recommendations(SBAR). Information from the following report(s) SBAR, ED Summary and MAR was reviewed with the receiving nurse. Lines:       Opportunity for questions and clarification was provided.       Patient awaiting transport

## 2020-05-22 NOTE — BH NOTES
Pt has been encouraged to participate  In treatment but very guarded and  Withdrawn. Out for breafast but took food into room informing her that she could not do this. Refused lunch and has been lying in bed awake. No self disclosure offered. Attended group for about 10 minutes and left. Contiue to MD [employer] for safety on unit.

## 2020-05-22 NOTE — ED NOTES
Pt transported via [facility] to [facility].  Belongings returned to patient and [facility] Staff

## 2020-05-22 NOTE — BSMART NOTE
ART THERAPY GROUP PROGRESS NOTE PATIENT SCHEDULED FOR GROUP AT: 8:45 ATTENDANCE: 1/4 PARTICIPATION LEVEL: Participates fully in the art process. ATTENTION LEVEL: Able to focus on task. FOCUS: Self Affirmation SYMBOLIC & THEMATIC CONTENT AS NOTED IN IMAGERY: She presented with a euthymic mood and blunted affect and her thought processes were brief. She was actively engaged in the art therapy directive and her approach to task was purposeful. She worked quietly on [employer] therapy directive and left group after finishing the directive to go to her room. Thematic content was appropriate for the given directive.

## 2020-05-23 LAB
CHOLEST SERPL-MCNC: 133 MG/DL
HBA1C MFR BLD: 5.1 % (ref 4.2–5.6)
HDLC SERPL-MCNC: 50 MG/DL (ref 40–60)
HDLC SERPL: 2.7 {RATIO} (ref 0–5)
LDLC SERPL CALC-MCNC: 69 MG/DL (ref 0–100)
LIPID PROFILE,FLP: NORMAL
TRIGL SERPL-MCNC: 70 MG/DL (ref ?–150)
TSH SERPL DL<=0.05 MIU/L-ACNC: 0.93 UIU/ML (ref 0.36–3.74)
VLDLC SERPL CALC-MCNC: 14 MG/DL

## 2020-05-23 PROCEDURE — 74011250637 HC RX REV CODE- 250/637: Performed by: PSYCHIATRY & NEUROLOGY

## 2020-05-23 PROCEDURE — 80061 LIPID PANEL: CPT

## 2020-05-23 PROCEDURE — 83036 HEMOGLOBIN GLYCOSYLATED A1C: CPT

## 2020-05-23 PROCEDURE — 65220000003 HC RM SEMIPRIVATE PSYCH

## 2020-05-23 PROCEDURE — 36415 COLL VENOUS BLD VENIPUNCTURE: CPT

## 2020-05-23 PROCEDURE — 84443 ASSAY THYROID STIM HORMONE: CPT

## 2020-05-23 RX ADMIN — ARIPIPRAZOLE 2 MG: 2 TABLET ORAL at 08:45

## 2020-05-23 RX ADMIN — ESCITALOPRAM OXALATE 10 MG: 10 TABLET ORAL at 08:45

## 2020-05-23 NOTE — BH NOTES
Patient interacted with other patient this evening. Patient appeared a little nervous and scared by warm up a little. Patient made several phone calls to her mother. Mother suppose to bring her some clothes tomorrow. Patient ate a snack. Patient will be monitored for safety.

## 2020-05-23 NOTE — PROGRESS NOTES
Problem: Suicide  Goal: *STG: Remains safe in hospital  Description: AEB remaining safe daily while hospitalized. Outcome: Progressing Towards Goal  Goal: *STG/LTG: No longer expresses self destructive or suicidal thoughts  Description: AEB verbalizing that she no longer experiences self destructive or suicidal thoughts daily while hospitalized. Outcome: Progressing Towards Goal     Pt denies SI, intent, or plan. Pt denies hallucinations of all types. Pt presents with bright affect. Pt states, \"My mood swings are a lot better today. I just feel like my medicine is making me talk a lot. \" Pt coming up to desk asking about how her medications work and was educated about intended effects, etc. Pt is medication compliant. Will continue to monitor.

## 2020-05-23 NOTE — PROGRESS NOTES
[facility]  Inpatient Progress Note     Date of Service: 05/23/20  Hospital Day: 2     Subjective/Interval History   05/23/20    Treatment Team Notes:  Notes reviewed and/or discussed and report that Yolanda Villareal is a 69-year-old female admitted for depression with SI. Patient interview: Yolanda Villareal was interviewed by this writer today. Patient today mentioned feeling the same as before regarding depression and mood state. She reported struggles with irritability and anger. Objective     Visit Vitals  /80 (BP 1 Location: Left arm)   Pulse 80   Temp 97.9 °F (36.6 °C)   Resp 20   Wt 68 kg (150 lb)   BMI 27.44 kg/m²       Recent Results (from the past 24 hour(s))   HEMOGLOBIN A1C W/O EAG    Collection Time: 05/23/20  6:05 AM   Result Value Ref Range    Hemoglobin A1c 5.1 4.2 - 5.6 %   LIPID PANEL    Collection Time: 05/23/20  6:05 AM   Result Value Ref Range    LIPID PROFILE          Cholesterol, total 133 <200 MG/DL    Triglyceride 70 <150 MG/DL    HDL Cholesterol 50 40 - 60 MG/DL    LDL, calculated 69 0 - 100 MG/DL    VLDL, calculated 14 MG/DL    CHOL/HDL Ratio 2.7 0 - 5.0     TSH 3RD GENERATION    Collection Time: 05/23/20  6:05 AM   Result Value Ref Range    TSH 0.93 0.36 - 3.74 uIU/mL       Mental Status Examination     Appearance/Hygiene 30 y.o.  BLACK OR  female  Hygiene: Reasonable grooming   Behavior/Social Relatedness Appropriate   Musculoskeletal Gait/Station: appropriate  Tone (flaccid, cogwheeling, spastic): not assessed  Psychomotor (hyperkinetic, hypokinetic): calm   Involuntary movements (tics, dyskinesias, akathisa, stereotypies): none   Speech   Rate, rhythm, volume, fluency and articulation are appropriate   Mood   depressed   Affect    Blunted   Thought Process Linear and goal directed   Thought Content and Perceptual Disturbances Denies self-injurious behavior (SIB), suicidal ideation (SI), aggressive behavior or homicidal ideation (HI)    Denies auditory and visual hallucinations   Sensorium and Cognition  Grossly intact   Insight  fair   Judgment fair        Assessment/Plan      Psychiatric Diagnoses:   Patient Active Problem List   Diagnosis Code    Severe recurrent major depressive disorder with psychotic features (HCC) F33.3       Psychosocial and contextual factors: Relationship difficulties with     Level of impairment/disability: Moderate - severe    Maral Hoffman is a 30 y.o. who is currently admitted for management of depression with SI.      1.  Continue current medication regimen as started yesterday to give it time to work. So far patienthas not c/o any side effects. 2.  Reviewed instructions, risks, benefits and side effects of medications  3.   Disposition/Discharge Date: BRANDEN Macias MD DR[facility]  Psychiatry

## 2020-05-23 NOTE — GROUP NOTE
[facility]  GROUP DOCUMENTATION INDIVIDUAL Group Therapy Note Date: 5/22/2020 Group Start Time: 2100 Group End Time: 2145 Group Topic: Nursing [facility] 1 ADULT CHEM DEP Asuncion Blevins RN 
 
[facility] GROUP DOCUMENTATION GROUP Group Therapy Note Attendees: 4 Attendance: Attended Interventions/techniques: Challenged and Informed Follows Directions: Followed directions Interactions: Interacted appropriately Mental Status: Calm Behavior/appearance: Attentive Goals Achieved: Able to engage in interactions and Able to listen to others Kymberly Frances

## 2020-05-23 NOTE — GROUP NOTE
[facility]  GROUP DOCUMENTATION INDIVIDUAL Group Therapy Note Date: 5/23/2020 Group Start Time: 1656 Group End Time: 1900 Group Topic: Nursing [facility] 1 SPECIAL TRTMT 1 JUAN JOSE Pacheco  GROUP DOCUMENTATION GROUP Group Therapy Note Attendees: 6 Attendance: Attended Interventions/techniques: Informed Follows Directions: Followed directions Interactions: Interacted appropriately Mental Status: Calm Behavior/appearance: Cooperative Goals Achieved: Able to engage in interactions and Able to listen to others Maddie Laureano RN

## 2020-05-23 NOTE — PROGRESS NOTES
Problem: Falls - Risk of  Goal: *Absence of Falls  Description: Document Gasper Dupree Fall Risk and appropriate interventions in the flowsheet. AEB remaining free of falls daily while hospitalized. Note: Fall Risk Interventions:                                Problem: Suicide  Goal: *STG: Remains safe in hospital  Description: AEB remaining safe daily while hospitalized. Outcome: Progressing Towards Goal     Problem: Suicide  Goal: *STG: Attends activities and groups  Description: AEB attending at least 3-4 groups/activities daily while hospitalized. Outcome: Progressing Towards Goal   Patient has appeared to sleep  this shift. Attended group earlier. Is compliant with medication and treatment plan. Will continue to monitor for safety and changes. No complaints or concerns voiced.

## 2020-05-23 NOTE — GROUP NOTE
[facility]  GROUP DOCUMENTATION INDIVIDUAL Group Therapy Note Date: 5/23/2020 Group Start Time: 4550 Group End Time: 9983 Group Topic: Nursing [facility] 1 ADULT CHEM DEP Baldo MACIAS  GROUP DOCUMENTATION GROUP Group Therapy Note Attendees: 6 Attendance: Attended Patient's Goal:  Emotions and How to Process Them Interventions/techniques: Informed Follows Directions: Followed directions Interactions: Interacted appropriately Mental Status: Calm Behavior/appearance: Cooperative Goals Achieved: Able to engage in interactions Nathan Grace

## 2020-05-24 PROCEDURE — 65220000003 HC RM SEMIPRIVATE PSYCH

## 2020-05-24 PROCEDURE — 74011250637 HC RX REV CODE- 250/637: Performed by: PSYCHIATRY & NEUROLOGY

## 2020-05-24 RX ORDER — ARIPIPRAZOLE 5 MG/1
5 TABLET ORAL DAILY
Status: DISCONTINUED | OUTPATIENT
Start: 2020-05-24 | End: 2020-05-26 | Stop reason: HOSPADM

## 2020-05-24 RX ADMIN — ESCITALOPRAM OXALATE 10 MG: 10 TABLET ORAL at 10:16

## 2020-05-24 RX ADMIN — IBUPROFEN 600 MG: 600 TABLET, FILM COATED ORAL at 17:05

## 2020-05-24 RX ADMIN — ARIPIPRAZOLE 5 MG: 5 TABLET ORAL at 10:16

## 2020-05-24 NOTE — PROGRESS NOTES
Problem: Suicide  Goal: *STG: Remains safe in hospital  Description: AEB remaining safe daily while hospitalized. Outcome: Progressing Towards Goal  Goal: *STG:  Verbalizes alternative ways of dealing with maladaptive feelings/behaviors  Description: AEB verbalizing at least three alternative ways of dealing with maladaptive feelings/behaviors daily while hospitalized. Outcome: Progressing Towards Goal  Goal: *STG/LTG: No longer expresses self destructive or suicidal thoughts  Description: AEB verbalizing that she no longer experiences self destructive or suicidal thoughts daily while hospitalized. Outcome: Progressing Towards Goal     Pt denies SI, intent, or plan. Pt also denies hallucinations of all types. Pt presents with bright affect and has been interactive with peers on the unit. Pt states, \"I feel like my medications are really starting to help. This makes me want to learn more about my illness and advocate for mental health when I get out. I feel like I'm really learning a lot. \" Pt is interactive in groups as well. Pt is medication compliant at this time. Will continue to monitor pt.

## 2020-05-24 NOTE — BH NOTES
Maru López received several phone calls from her mother and  calls went well. Pt has been  cooperative in the milieu interacting with staff and peers. At this time with this writer Pt. denies audio hallucination. At this time to this staff  pt denies harm to self or others. Pt contracts for safety on the unit. Pt.denies any new medical/pain complaints. Staff  provided positive feedback and support. St encouraged Pt. to participate  treatment plan. Pt agreed. Staff will continue to monitor Pt. for behavior safety and location.

## 2020-05-24 NOTE — BH NOTES
Patient was very sociable, spend most of the evening in the milieu watching television and talking with peers. Patient attended a nursng group and participated appropriately by answering questions when needed to or when was her time. Patient's  dropped of clothing for her during the shift which she was happy to receive. Staff will continue to monitor patient.

## 2020-05-24 NOTE — GROUP NOTE
COLLEEN  GROUP DOCUMENTATION INDIVIDUAL Group Therapy Note Date: 5/24/2020 Group Start Time: 1300 Group End Time: 5460 Group Topic: Nursing [facility] 1 ADULT CHEM DEP Jacinta MACIAS  GROUP DOCUMENTATION GROUP Group Therapy Note Attendees: 2 Attendance: Did not attend Jyoti Marie

## 2020-05-24 NOTE — BH NOTES
Pt denies SI, intent, or plan. Pt also denies hallucinations of all types. Pt has been calm, cooperative, and pleasant. Pt is interactive with peers. Pt states, \"I feel like I finally am learning how to deal with my illness. \" Will continue to monitor.

## 2020-05-24 NOTE — PROGRESS NOTES
[address]  Inpatient Progress Note     Date of Service: 05/24/20  Hospital Day: 3     Subjective/Interval History   05/24/20    Treatment Team Notes:  Notes reviewed and/or discussed and report that Jazz Saul is a 29-year-old female admitted for depression with SI. Patient interview: Jazz Saul was interviewed by this writer today. Patient presented as mildly brighter and relaxed and reported improvement in mood, depression, and anxiety as well as irritability. She also reported mildly improved ability to cope withy stress and anger. Objective     Visit Vitals  /68 (BP 1 Location: Left arm)   Pulse 60   Temp 97.8 °F (36.6 °C)   Resp 18   Wt 68 kg (150 lb)   BMI 27.44 kg/m²       No results found for this or any previous visit (from the past 24 hour(s)). Mental Status Examination     Appearance/Hygiene 30 y.o.  BLACK OR  female  Hygiene: Reasonable grooming   Behavior/Social Relatedness Appropriate   Musculoskeletal Gait/Station: appropriate  Tone (flaccid, cogwheeling, spastic): not assessed  Psychomotor (hyperkinetic, hypokinetic): calm   Involuntary movements (tics, dyskinesias, akathisa, stereotypies): none   Speech   Rate, rhythm, volume, fluency and articulation are appropriate   Mood   Better   Affect    anxious   Thought Process Linear and goal directed   Thought Content and Perceptual Disturbances Denies self-injurious behavior (SIB), suicidal ideation (SI), aggressive behavior or homicidal ideation (HI)    Denies auditory and visual hallucinations   Sensorium and Cognition  Grossly intact   Insight  fair   Judgment fair        Assessment/Plan      Psychiatric Diagnoses:   Patient Active Problem List   Diagnosis Code    Severe recurrent major depressive disorder with psychotic features ([facility]) F33.3       Psychosocial and contextual factors: Relationship difficulties with     Level of impairment/disability: Moderate    Renata Marlena Lujan is a 30 y.o. who is currently admitted for management of depression with SI.      1.  Increase Abilify for improved efficacy; continue other medications unchanged. So far patient has not c/o any side effects. 2.  Reviewed instructions, risks, benefits and side effects of medications  3.   Disposition/Discharge Date: 2-3 days    Howard Morales MD  Medical [facility]  Psychiatry

## 2020-05-25 LAB — HCG SERPL-ACNC: 173 MIU/ML (ref 0–10)

## 2020-05-25 PROCEDURE — 84702 CHORIONIC GONADOTROPIN TEST: CPT

## 2020-05-25 PROCEDURE — 74011250637 HC RX REV CODE- 250/637: Performed by: PSYCHIATRY & NEUROLOGY

## 2020-05-25 PROCEDURE — 36415 COLL VENOUS BLD VENIPUNCTURE: CPT

## 2020-05-25 PROCEDURE — 65220000003 HC RM SEMIPRIVATE PSYCH

## 2020-05-25 RX ADMIN — ARIPIPRAZOLE 5 MG: 5 TABLET ORAL at 08:41

## 2020-05-25 RX ADMIN — ESCITALOPRAM OXALATE 10 MG: 10 TABLET ORAL at 08:41

## 2020-05-25 NOTE — BH NOTES
Pt has been in the day area most of the shift. Pt has been  cooperative in the milieu interacting with staff and peers. At this time with this writer Pt. denies audio hallucination. At this time to this staff  pt denies harm to self or others. Pt contracts for safety on the unit. Pt.denies any new medical/pain complaints. Staff  provided positive feedback and support. St encouraged Pt. to participate  treatment plan. Pt agreed. Staff will continue to monitor Pt. for behavior safety and location.

## 2020-05-25 NOTE — PROGRESS NOTES
[address]  Inpatient Progress Note     Date of Service: 05/25/20  Hospital Day: 4     Subjective/Interval History   05/25/20    Treatment Team Notes:  Notes reviewed and/or discussed and report that Shivam Casey is a pt with a hx of depression with psychotic symptoms, currently being treated with a combination of aripiprazole and escitalopram. The pt is tolerating the combination well. She described her mother and  calling her frequently. Positive tx response described with the possibility of her being discharged tomorrow, being considered. Patient interview: Shivam Casey was interviewed by this writer today. Less depressed, increased appetite with improved sleeping patterns described. Possible discharge tomorrow if stable. OP care a most.      Objective     Visit Vitals  /54 (BP 1 Location: Left arm, BP Patient Position: Sitting)   Pulse 76   Temp 98.8 °F (37.1 °C)   Resp 16   Wt 68 kg (150 lb)   BMI 27.44 kg/m²     Vitals are stable. Recent Results (from the past 24 hour(s))   BETA HCG, QT    Collection Time: 05/25/20  6:55 AM   Result Value Ref Range    Beta HCG,  (H) 0 - 10 MIU/ML     BETA HCG results are decreasing as expected. (Medications induced pregnancy termination). Mental Status Examination     Appearance/Hygiene 30 y.o. BLACK OR  female  Hygiene: good. Behavior/Social Relatedness Appropriate   Musculoskeletal Gait/Station: appropriate  Tone (flaccid, cogwheeling, spastic): not assessed  Psychomotor (hyperkinetic, hypokinetic): calm   Involuntary movements (tics, dyskinesias, akathisa, stereotypies): none   Speech   Rate, rhythm, volume, fluency and articulation are appropriate   Mood   Less depressed. Affect    Appropriate.    Thought Process Linear and goal directed   Thought Content and Perceptual Disturbances Denies self-injurious behavior (SIB), suicidal ideation (SI), aggressive behavior or homicidal ideation (HI)    Denies auditory and visual hallucinations   Sensorium and Cognition  Grossly intact   Insight  Good. Judgment Good. Assessment/Plan      Psychiatric Diagnoses:   Patient Active Problem List   Diagnosis Code    Severe recurrent major depressive disorder with psychotic features ([facility] [address] F33.3       Medical Diagnoses: SP medications induced pregnancy termination. Psychosocial and contextual factors: same. Level of impairment/disability: moderate. 1.  Much improved mood, the pt's vegetative symptoms are also improving. Possible discharge tomorrow. 2.  Reviewed instructions, risks, benefits and side effects of medications  3. Disposition/Discharge Date: self-care/home.     Zina Tripp MD, [facility]  Psychiatry

## 2020-05-25 NOTE — PROGRESS NOTES
Problem: Suicide  Goal: *STG: Remains safe in hospital  Description: AEB remaining safe daily while hospitalized. Outcome: Progressing Towards Goal  Note: Patient will remain safe in the hospital daily  Goal: *STG: Attends activities and groups  Description: AEB attending at least 3-4 groups/activities daily while hospitalized. Outcome: Progressing Towards Goal  Note: Patient will attend scheduled activities and groups daily  Goal: *STG/LTG: No longer expresses self destructive or suicidal thoughts  Description: AEB verbalizing that she no longer experiences self destructive or suicidal thoughts daily while hospitalized. Outcome: Progressing Towards Goal  Note: Patient will no longer express self destructive or SI thoughts daily    Patient has been visible and active in the day area on and off throughout shift. She has attended community group, complied with medications, and engaged with peers at times. Denies further thoughts of self harm. Mood stable and appropriate. Will continue to monitor for safety and support.

## 2020-05-25 NOTE — GROUP NOTE
IP  GROUP DOCUMENTATION INDIVIDUAL Group Therapy Note Date: 5/24/2020 Group Start Time: 1000 Group End Time: 3520 Group Topic: Nursing [facility] 1 ADULT CHEM DEP Katy Hardy RN 
 
[facility] GROUP DOCUMENTATION GROUP Group Therapy Note Attendees: 7 Attendance: Attended Interventions/techniques: Informed Follows Directions: Followed directions Interactions: Interacted appropriately Mental Status: Calm and Happy Behavior/appearance: Attentive and Neatly groomed Goals Achieved: Able to engage in interactions and Able to listen to others Janeen Watts RN

## 2020-05-26 VITALS
WEIGHT: 150 LBS | BODY MASS INDEX: 27.44 KG/M2 | SYSTOLIC BLOOD PRESSURE: 106 MMHG | DIASTOLIC BLOOD PRESSURE: 56 MMHG | TEMPERATURE: 97.4 F | RESPIRATION RATE: 16 BRPM | HEART RATE: 68 BPM

## 2020-05-26 PROCEDURE — 74011250637 HC RX REV CODE- 250/637: Performed by: PSYCHIATRY & NEUROLOGY

## 2020-05-26 RX ORDER — ARIPIPRAZOLE 5 MG/1
5 TABLET ORAL DAILY
Qty: 15 TAB | Refills: 1 | Status: SHIPPED | OUTPATIENT
Start: 2020-05-27

## 2020-05-26 RX ORDER — ESCITALOPRAM OXALATE 10 MG/1
10 TABLET ORAL DAILY
Qty: 15 TAB | Refills: 1 | Status: SHIPPED | OUTPATIENT
Start: 2020-05-27

## 2020-05-26 RX ADMIN — ARIPIPRAZOLE 5 MG: 5 TABLET ORAL at 08:12

## 2020-05-26 RX ADMIN — ESCITALOPRAM OXALATE 10 MG: 10 TABLET ORAL at 08:12

## 2020-05-26 NOTE — BSMART NOTE
SOCIAL WORK GROUP THERAPY PROGRESS NOTE Group Time:  10:15am  
 
Group Topic:  Coping Skills    C D Issues Group Participation:   
 
Pt moderately involved during group discussion but remained attentive & did contribute about her negative thinking. \"Seven Steps\" for taking responsibility for our Happiness was reviewed including commitment to change, self-care, setting limits, goal setting & letting go. Reviewed strategies to keep a \"Journal\" for moods, cognitions, behavior & outcome. Admitted \" needing to work on that\".

## 2020-05-26 NOTE — BH NOTES
Pt refused flu shot stating that she does not want one. Will continue to monitor for safety and support.

## 2020-05-26 NOTE — BH NOTES
Reviewed discharge paperwork with patient, answered all questions, and removed armband. Pt stable and in no distress. Accompanied downstairs for discharge.

## 2020-05-26 NOTE — DISCHARGE INSTRUCTIONS
BEHAVIORAL HEALTH NURSING DISCHARGE NOTE      The following personal items collected during your admission are returned to you:   Dental Appliance:    Vision:    Hearing Aid:    Jewelry:    Clothing: Clothing: Footwear, Hat, Jacket/Coat, Pants, Shirt, Socks(Scuff and hoody with string in locker.)  Other Valuables: Other Valuables: Cell Phone, Keys, Personal electronic devices (comment), Purse, Wallet(ear phones in locker. along with umbrella.)  Valuables sent to safe: Personal Items Sent to Safe: ([place] Aakash Bains 1 master cards-I.D- [place] Uniform card)      PATIENT INSTRUCTIONS:      Follow-up with Patient will follow up with [facility] and [address]  [phone]    Numbers to remember Shraddha Mishra [phone]     The discharge information has been reviewed with the {PATIENT PARENT GUARDIAN:07709}. The {PATIENT PARENT GUARDIAN:67579} verbalized understanding. M2G Activation    Thank you for requesting access to M2G. Please follow the instructions below to securely access and download your online medical record. M2G allows you to send messages to your doctor, view your test results, renew your prescriptions, schedule appointments, and more. How Do I Sign Up? 1. In your internet browser, go to [email or web address]  2. Click on the First Time User? Click Here link in the Sign In box. You will be redirect to the New Member Sign Up page. 3. Enter your M2G Access Code exactly as it appears below. You will not need to use this code after youve completed the sign-up process. If you do not sign up before the expiration date, you must request a new code. M2G Access Code: [email or web address]  Expires: 2020  8:25 PM (This is the date your M2G access code will )    4. Enter the last four digits of your Social Security Number (xxxx) and Date of Birth (mm/dd/yyyy) as indicated and click Submit.  You will be taken to the next sign-up page. 5. Create a MugenUp ID. This will be your MugenUp login ID and cannot be changed, so think of one that is secure and easy to remember. 6. Create a MugenUp password. You can change your password at any time. 7. Enter your Password Reset Question and Answer. This can be used at a later time if you forget your password. 8. Enter your e-mail address. You will receive e-mail notification when new information is available in [address] 9. Click Sign Up. You can now view and download portions of your medical record. 10. Click the Download Summary menu link to download a portable copy of your medical information. Additional Information    If you have questions, please visit the Frequently Asked Questions section of the MugenUp website at [email or web address] Remember, MugenUp is NOT to be used for urgent needs. For medical emergencies, dial 911. Patient {ARMBANDS:20124}    BEHAVIORAL HEALTH NURSING DISCHARGE NOTE      The following personal items collected during your admission are returned to you:   Dental Appliance:    Vision:    Hearing Aid:    Jewelry:    Clothing: Clothing: Footwear, Hat, Jacket/Coat, Pants, Shirt, Socks(Scuff and hoody with string in locker.)  Other Valuables: Other Valuables: Cell Phone, Keys, Personal electronic devices (comment), Purse, Wallet(ear phones in locker. along with umbrella.)  Valuables sent to safe: Personal Items Sent to Safe: (N.ARTHUR Arteaga Feeling 1 master cards-I.D- [place] Uniform card)      PATIENT INSTRUCTIONS:    Patient armband removed and shredded    The discharge information has been reviewed with the patient. The patient verbalized understanding.     Phone numbers to remember  [address] desk: [facility] Emergency Services: [phone]  Suicide Prevention:  [phone]

## 2020-05-27 NOTE — DISCHARGE SUMMARY
[address]  DISCHARGE    Name:  Kirt Bain  MR#:   [de-identified]  :  1995  ACCOUNT #:  [de-identified]  ADMIT DATE:  2020  DISCHARGE DATE:  2020    SIGNIFICANT FINDINGS:  History and physical exam was performed shortly after the patient was admitted to the facility, attention invited to this document, a place where the patient's reasons for admission, the same as the findings upon her being examined at the emergency room are very clearly stated. For the purpose of this discharge summary, the reader is advised that prior to her being admitted to our facility, the patient was evaluated at [facility] Emergency Department, reason for evaluation being her describing self as increasingly depressed and suicidal.  Allegedly, her  had been the one who had been able to stop the patient trying to cut her wrists, this being for the purpose of killing herself she indicated. Depression, which is recurrent in association, the patient believed upon admission with a sense of guilt that she had after having an  last week (this was the second twin pregnancy the patient had with her describing two twin boys rather healthy and rather active and her not seeing self as being able to handle another pair of twins), appearing to be the precipitant factor for her feeling rather guilty after she decided to have a termination of the pregnancy. The patient described her becoming increasingly helpless and hopeless and having recurrent suicidal thoughts, these symptoms worsening by the presence of voices which were very derogatory telling the patient to kill herself. The patient indicated that she tends to hear those voices when she is by herself only, with again increased anxiety and depression being associated to their presence. The patient was presented via Telepsych with the telepsychiatrist evaluation being supportive of the patient needing inpatient hospitalization.   So, the physician on-call was called with the patient being then admitted very kindly to the service of the undersigned. PSYCHIATRIC HISTORY:  Remarkable of her becoming depressed at the time of her twin boys' delivery on or about 2016. She described the symptoms began almost on the same day in which the twins were delivered and got worse during the first 6-8 weeks for postpartum. It is not clear if she was diagnosed then with a peripartum depression or not; however, the patient described symptoms developed when she learned that she was again pregnant being very similar to the one that she had 3 years ago. The patient also described having some problems with her  from whom she had been  for a while; however, she decided to come back to this area when she failed to obtain emotional support from her mother and other family and friends of hers. Some other issues regarding these stress factors precipitating her depression reoccurring are clearly described again in the patient's admission note. While at [facility], multiple labs were performed including a CBC with differential that showed very mild anemia with a hemoglobin of 11.3 and a normal hematocrit of 35.1. White blood cells are slightly reduced to 4.1 with RBC to 4.15 millions per unit liter. Differential was normal.  Platelet count normal.  CMP showed normal electrolytes, normal kidney functioning tests, normal liver function tests. Urine pregnancy test was positive with a serum pregnancy test showing 900 micro international units/mL with the patient's prior serum pregnancy test done on 05/08 showing an elevated result of 1965.   As I had above indicated, the patient had just had a medical induced termination of her pregnancy several days earlier, which explains why the results were still positive on her serum pregnancy test; however, it is also to be mentioned that when we repeated the serum pregnancy test on 05/25, the results were even lower at 173, this obviously considered to be a normal progression of her pregnancy termination. Urine drug screen was positive for cannabis. Acetaminophen and salicylate levels were below range. Prior to her admission to our facility, also a coronavirus test/nasopharyngeal test was performed with negative results. COURSE DURING HOSPITALIZATION AND TREATMENT:  Admitted to the adult program, the patient has been seen daily and has been referred to the groups within context of the program.  With a history of depression characterized by intense psychomotor retardation, the same as decreased appetite and poor sleeping patterns with the presence of auditory hallucinations, the patient was prescribed not only with escitalopram 10 mg a day which she tolerated by the way very well, but also in addition a prescription for aripiprazole increased to 5 mg every night with the patient's auditory hallucinations responding to complete remission. Aripiprazole was also helpful in that the patient's sleeping patterns began to improve, the same as her appetite began to increase. Because of her being prescribed with an atypical antipsychotic, we also proceeded to obtain a lipid panel which showed normal results with triglycerides of 70, total cholesterol 133, HDL 58, cholesterol-HDL ratio 2.7 and LDL of 69. A1c normal at 5.1%. For completeness, a TSH was done, this showing normal results at 0.93 international units/mL on 05/23. The patient participated with the groups within context of the program, began to relate better on the phone to her  and also to her mother and both of them showing rather positive support for the patient to the point in which her depression began to lift and when seen today, doing much better, the patient is ready to be discharged home.     CONDITION UPON DISCHARGE:  Not suicidal or homicidal, not psychotic, showing no evidence of cognitive impairment and considered to be psychiatrically competent. FINAL DIAGNOSES:  AXIS I:  Major depressive disorder, recurrent with psychotic symptoms, the latter in remission. Cannabis use disorder, mild. AXIS II:  Noncontributory. AXIS III:  Status post recent termination of pregnancy by ingestion of oral medications for same. DISPOSITION:  The patient is going to be discharged today. She is going to be staying with her  and twin boys at home. Outpatient followup psychiatrically through [facility] and [facility] in [place]. She will continue to be followed by the PCP of her choice medically and will visit with her OB/GYN physician in the near future. PRESCRIPTIONS UPON DISCHARGE:  The patient will be provided with 2 weeks' prescriptions for Lexapro 10 mg every morning and Abilify 5 mg every night. Both prescriptions have one refill each. No evidence of medications-related side effects noted. PROGNOSIS:  Fairly good with combination of medications and therapy as an outpatient.       Dalia Salgado MD, LFAPA      [id number]  D:  05/26/2020 10:59  T:  05/26/2020 20:07  JOB #:  [id number]

## 2020-12-03 ENCOUNTER — APPOINTMENT (OUTPATIENT)
Dept: ULTRASOUND IMAGING | Age: 25
End: 2020-12-03
Attending: PHYSICIAN ASSISTANT
Payer: OTHER GOVERNMENT

## 2020-12-03 ENCOUNTER — HOSPITAL ENCOUNTER (EMERGENCY)
Age: 25
Discharge: LEFT BEFORE DISCHARGE | End: 2020-12-03
Attending: EMERGENCY MEDICINE
Payer: OTHER GOVERNMENT

## 2020-12-03 VITALS
SYSTOLIC BLOOD PRESSURE: 128 MMHG | DIASTOLIC BLOOD PRESSURE: 75 MMHG | WEIGHT: 155 LBS | RESPIRATION RATE: 20 BRPM | BODY MASS INDEX: 28.52 KG/M2 | OXYGEN SATURATION: 100 % | HEIGHT: 62 IN | TEMPERATURE: 98.2 F | HEART RATE: 93 BPM

## 2020-12-03 DIAGNOSIS — Z3A.09 9 WEEKS GESTATION OF PREGNANCY: Primary | ICD-10-CM

## 2020-12-03 LAB
ALBUMIN SERPL-MCNC: 3.8 G/DL (ref 3.4–5)
ALBUMIN/GLOB SERPL: 0.9 {RATIO} (ref 0.8–1.7)
ALP SERPL-CCNC: 48 U/L (ref 45–117)
ALT SERPL-CCNC: 20 U/L (ref 13–56)
ANION GAP SERPL CALC-SCNC: 4 MMOL/L (ref 3–18)
APPEARANCE UR: ABNORMAL
AST SERPL-CCNC: 13 U/L (ref 10–38)
BASOPHILS # BLD: 0 K/UL (ref 0–0.1)
BASOPHILS NFR BLD: 0 % (ref 0–2)
BILIRUB SERPL-MCNC: 0.4 MG/DL (ref 0.2–1)
BILIRUB UR QL: NEGATIVE
BUN SERPL-MCNC: 6 MG/DL (ref 7–18)
BUN/CREAT SERPL: 11 (ref 12–20)
CALCIUM SERPL-MCNC: 9.1 MG/DL (ref 8.5–10.1)
CHLORIDE SERPL-SCNC: 105 MMOL/L (ref 100–111)
CO2 SERPL-SCNC: 26 MMOL/L (ref 21–32)
COLOR UR: ABNORMAL
CREAT SERPL-MCNC: 0.55 MG/DL (ref 0.6–1.3)
DIFFERENTIAL METHOD BLD: ABNORMAL
EOSINOPHIL # BLD: 0.1 K/UL (ref 0–0.4)
EOSINOPHIL NFR BLD: 1 % (ref 0–5)
ERYTHROCYTE [DISTWIDTH] IN BLOOD BY AUTOMATED COUNT: 15.4 % (ref 11.6–14.5)
GLOBULIN SER CALC-MCNC: 4.1 G/DL (ref 2–4)
GLUCOSE SERPL-MCNC: 82 MG/DL (ref 74–99)
GLUCOSE UR STRIP.AUTO-MCNC: NEGATIVE MG/DL
HCG SERPL-ACNC: ABNORMAL MIU/ML (ref 0–10)
HCT VFR BLD AUTO: 32.2 % (ref 35–45)
HGB BLD-MCNC: 9.9 G/DL (ref 12–16)
HGB UR QL STRIP: NEGATIVE
KETONES UR QL STRIP.AUTO: NEGATIVE MG/DL
LEUKOCYTE ESTERASE UR QL STRIP.AUTO: NEGATIVE
LYMPHOCYTES # BLD: 2.1 K/UL (ref 0.9–3.6)
LYMPHOCYTES NFR BLD: 33 % (ref 21–52)
MCH RBC QN AUTO: 25 PG (ref 24–34)
MCHC RBC AUTO-ENTMCNC: 30.7 G/DL (ref 31–37)
MCV RBC AUTO: 81.3 FL (ref 74–97)
MONOCYTES # BLD: 0.3 K/UL (ref 0.05–1.2)
MONOCYTES NFR BLD: 5 % (ref 3–10)
NEUTS SEG # BLD: 3.8 K/UL (ref 1.8–8)
NEUTS SEG NFR BLD: 61 % (ref 40–73)
NITRITE UR QL STRIP.AUTO: NEGATIVE
PH UR STRIP: 6 [PH] (ref 5–8)
PLATELET # BLD AUTO: 232 K/UL (ref 135–420)
PMV BLD AUTO: 11.9 FL (ref 9.2–11.8)
POTASSIUM SERPL-SCNC: 3.7 MMOL/L (ref 3.5–5.5)
PROT SERPL-MCNC: 7.9 G/DL (ref 6.4–8.2)
PROT UR STRIP-MCNC: NEGATIVE MG/DL
RBC # BLD AUTO: 3.96 M/UL (ref 4.2–5.3)
SODIUM SERPL-SCNC: 135 MMOL/L (ref 136–145)
SP GR UR REFRACTOMETRY: >1.03 (ref 1–1.03)
UROBILINOGEN UR QL STRIP.AUTO: 1 EU/DL (ref 0.2–1)
WBC # BLD AUTO: 6.2 K/UL (ref 4.6–13.2)

## 2020-12-03 PROCEDURE — 76817 TRANSVAGINAL US OBSTETRIC: CPT

## 2020-12-03 PROCEDURE — 80053 COMPREHEN METABOLIC PANEL: CPT

## 2020-12-03 PROCEDURE — 81003 URINALYSIS AUTO W/O SCOPE: CPT

## 2020-12-03 PROCEDURE — 99282 EMERGENCY DEPT VISIT SF MDM: CPT

## 2020-12-03 PROCEDURE — 84702 CHORIONIC GONADOTROPIN TEST: CPT

## 2020-12-03 PROCEDURE — 85025 COMPLETE CBC W/AUTO DIFF WBC: CPT

## 2020-12-03 PROCEDURE — 74011250636 HC RX REV CODE- 250/636: Performed by: PHYSICIAN ASSISTANT

## 2020-12-03 RX ORDER — METOCLOPRAMIDE HYDROCHLORIDE 5 MG/ML
5 INJECTION INTRAMUSCULAR; INTRAVENOUS EVERY 6 HOURS
Status: DISCONTINUED | OUTPATIENT
Start: 2020-12-03 | End: 2020-12-04 | Stop reason: HOSPADM

## 2020-12-03 NOTE — ED TRIAGE NOTES
Pt with approx 9 weeks gestation with c/o excessive vomiting for the past 3 weeks. Pt is  with a identical twins with 1st pregnancy. Pt ALEXANDRA 2023    Pt denies cramping or vaginal bleeding.

## 2020-12-04 NOTE — ED NOTES
Patient refusing fluids and Reglan. \"I'm not nauseous. I'm throwing up. I don't want to be medicated in the waiting room. I just want to leave. \" Informed provider.

## 2020-12-04 NOTE — ED PROVIDER NOTES
EMERGENCY DEPARTMENT HISTORY AND PHYSICAL EXAM    Date: 12/3/2020  Patient Name: Farrah Cesar    History of Presenting Illness     Chief Complaint   Patient presents with    Pregnancy Problem         History Provided By: patient        Additional History (Context): Farrah Cesar is a 13-year-old female here with complaint of vomiting and pregnancy. Patient is approximately 9 weeks pregnant, has not had an ultrasound to confirm IUP at this time. Patient states she is a , has identical twins at home. Was not nauseous with these twins but states about 3 weeks ago she started having vomiting and is unable to keep anything down. States she is not nauseated but states every time she eats she vomits. States she feels well otherwise, has been trying to keep down water with some success. OB/GYN is Dr. Nikolay Norton, called office and was told to come to ED for IV fluid and management. States she has a prescription for Diclegis which does not help. PCP: Ruchi, Not On File    Current Outpatient Medications   Medication Sig Dispense Refill    ARIPiprazole (ABILIFY) 5 mg tablet Take 1 Tab by mouth daily. Indications: additional medications to treat depression, MDD with psychotic features. 15 Tab 1    escitalopram oxalate (LEXAPRO) 10 mg tablet Take 1 Tab by mouth daily. Indications: major depressive disorder 15 Tab 1       Past History     Past Medical History:  History reviewed. No pertinent past medical history. Past Surgical History:  History reviewed. No pertinent surgical history. Family History:  History reviewed. No pertinent family history. Social History:  Social History     Tobacco Use    Smoking status: Never Smoker    Smokeless tobacco: Never Used   Substance Use Topics    Alcohol use: Yes     Comment: occ    Drug use: Not Currently       Allergies:  No Known Allergies      Review of Systems       Review of Systems   Constitutional: Negative for chills and fever.    HENT: Negative for nasal congestion, sore throat, rhinorrhea  Eyes: Negative. Respiratory: Negative for cough and negative for shortness of breath. Cardiovascular: Negative for chest pain and palpitations. Gastrointestinal: Negative for abdominal pain, constipation, diarrhea, nausea and vomiting. Genitourinary: Negative. Negative for difficulty urinating and flank pain. Musculoskeletal: Negative for back pain. Negative for gait problem and neck pain. Skin: Negative. Allergic/Immunologic: Negative. Neurological: Negative for dizziness, weakness, numbness and headaches. Psychiatric/Behavioral: Negative. All other systems reviewed and are negative. All Other Systems Negative  Physical Exam     Vitals:    12/03/20 1750   BP: 128/75   Pulse: 93   Resp: 20   Temp: 98.2 °F (36.8 °C)   SpO2: 100%   Weight: 70.3 kg (155 lb)   Height: 5' 2\" (1.575 m)     Physical Exam  Vitals signs and nursing note reviewed. Constitutional:       General: She is not in acute distress. Appearance: She is well-developed. She is not diaphoretic. HENT:      Head: Normocephalic and atraumatic. Nose: Nose normal.   Eyes:      Conjunctiva/sclera: Conjunctivae normal.      Pupils: Pupils are equal, round, and reactive to light. Neck:      Musculoskeletal: Normal range of motion and neck supple. Cardiovascular:      Rate and Rhythm: Normal rate and regular rhythm. Pulmonary:      Effort: Pulmonary effort is normal. No respiratory distress. Breath sounds: Normal breath sounds. Abdominal:      General: There is no distension. Palpations: Abdomen is soft. There is no mass. Tenderness: There is no abdominal tenderness. There is no right CVA tenderness, left CVA tenderness, guarding or rebound. Hernia: No hernia is present. Musculoskeletal: Normal range of motion. Skin:     General: Skin is warm. Findings: No rash.    Neurological:      Mental Status: She is alert and oriented to person, place, and time. Cranial Nerves: No cranial nerve deficit. Coordination: Coordination normal.   Psychiatric:         Behavior: Behavior normal.           Diagnostic Study Results     Labs -   No results found for this or any previous visit (from the past 12 hour(s)). Radiologic Studies -   US OB TV W DOPPLER   Final Result   IMPRESSION:      Single living IUP corresponding to 9 weeks gestation. Small subchorionic hemorrhage. Bilateral ovarian follicles/cysts. The study was initially interpreted by the radiology resident at the time of the   examination and the appropriate personnel informed. CT Results  (Last 48 hours)    None        CXR Results  (Last 48 hours)    None            Medical Decision Making   I am the first provider for this patient. I reviewed the vital signs, available nursing notes, past medical history, past surgical history, family history and social history. Vital Signs-Reviewed the patient's vital signs. Records Reviewed: Nursing notes, old medical records and any previous labs, imaging, visits, consultations pertinent to patient care    Procedures:  Procedures      ED Course: Progress Notes, Reevaluation, and Consults:      Provider Notes (Medical Decision Making):   Pt eloped prior to completion of care. US reviewed- attempted to notify pt of findings of 9 week IUP. MED RECONCILIATION:  No current facility-administered medications for this encounter. Current Outpatient Medications   Medication Sig    ARIPiprazole (ABILIFY) 5 mg tablet Take 1 Tab by mouth daily. Indications: additional medications to treat depression, MDD with psychotic features.  escitalopram oxalate (LEXAPRO) 10 mg tablet Take 1 Tab by mouth daily. Indications: major depressive disorder       Disposition:  home    DISCHARGE NOTE:     Pt has been reexamined. Patient has no new complaints, changes, or physical findings. Care plan outlined and precautions discussed.  Discussed proper way to take medications. Discussed treatment plan, return precautions, symptomatic relief, and expected time to improvement. All questions answered. Patient is stable for discharge and outpatient management. Patient is ready to go home. Follow-up Information     Follow up With Specialties Details Why [facility] EMERGENCY DEPT Emergency Medicine   [address]  [phone]          Discharge Medication List as of 12/3/2020 10:45 PM                Diagnosis     Clinical Impression:   1. 9 weeks gestation of pregnancy            Dictation disclaimer:  Please note that this dictation was completed with [employer], the computer voice recognition software. Quite often unanticipated grammatical, syntax, homophones, and other interpretive errors are inadvertently transcribed by the computer software. Please disregard these errors. Please excuse any errors that have escaped final proofreading.

## 2021-06-01 PROBLEM — Z00.00 ENCOUNTER FOR PREVENTIVE HEALTH EXAMINATION: Status: ACTIVE | Noted: 2021-06-01

## 2021-06-04 ENCOUNTER — APPOINTMENT (OUTPATIENT)
Dept: OBGYN | Facility: CLINIC | Age: 26
End: 2021-06-04

## 2022-03-18 PROBLEM — F33.3 SEVERE RECURRENT MAJOR DEPRESSIVE DISORDER WITH PSYCHOTIC FEATURES (HCC): Status: ACTIVE | Noted: 2020-05-21

## 2024-01-23 PROCEDURE — 99282 EMERGENCY DEPT VISIT SF MDM: CPT

## 2024-01-24 ENCOUNTER — HOSPITAL ENCOUNTER (EMERGENCY)
Facility: HOSPITAL | Age: 29
Discharge: HOME | End: 2024-01-24
Attending: STUDENT IN AN ORGANIZED HEALTH CARE EDUCATION/TRAINING PROGRAM
Payer: COMMERCIAL

## 2024-01-24 VITALS
WEIGHT: 150 LBS | RESPIRATION RATE: 18 BRPM | OXYGEN SATURATION: 100 % | SYSTOLIC BLOOD PRESSURE: 128 MMHG | TEMPERATURE: 97.8 F | DIASTOLIC BLOOD PRESSURE: 87 MMHG | HEART RATE: 85 BPM

## 2024-01-24 DIAGNOSIS — Z00.8 ENCOUNTER FOR MEDICAL ASSESSMENT: Primary | ICD-10-CM

## 2024-01-24 LAB — S PYO AG THROAT QL: NEGATIVE

## 2024-01-24 PROCEDURE — 87070 CULTURE OTHR SPECIMN AEROBIC: CPT

## 2024-01-24 PROCEDURE — 87880 STREP A ASSAY W/OPTIC: CPT

## 2024-01-24 ASSESSMENT — ENCOUNTER SYMPTOMS
CONSTIPATION: 0
ABDOMINAL PAIN: 0
DIARRHEA: 0
SHORTNESS OF BREATH: 0
BACK PAIN: 0

## 2024-01-24 NOTE — ED PROVIDER NOTES
EMERGENCY DEPARTMENT HISTORY AND PHYSICAL EXAM    4:51 AM      Date: 1/24/2024  Patient Name: Radha Funes    History of Presenting Illness     Chief Complaint   Patient presents with    Nasal Congestion     Patient has no symptoms. Wants to be checked for strep throat        History From: Patient  HPI  28-year-old female with no pertinent past medical history who admits to come for medical evaluation.  Patient states that she has no symptoms however her 3 children have viral URI symptoms and she wants to get checked out.  Patient up-to-date on vaccination.  When assessing ROS she denies any nausea, vomiting, chest pain, shortness of breath, rashes, or any other changes.     Nursing Notes were all reviewed and agreed with or any disagreements were addressed in the HPI.    PCP: Anila Paez APRN - NP    No current facility-administered medications for this encounter.     Current Outpatient Medications   Medication Sig Dispense Refill    ARIPiprazole (ABILIFY) 5 MG tablet Take 5 mg by mouth daily      escitalopram (LEXAPRO) 10 MG tablet Take 10 mg by mouth daily         Past History     Past Medical History:  No past medical history on file.    Past Surgical History:  No past surgical history on file.    Family History:  No family history on file.    Social History:  Social History     Tobacco Use    Smoking status: Never    Smokeless tobacco: Never   Substance Use Topics    Alcohol use: Yes    Drug use: Not Currently       Allergies:  Allergies   Allergen Reactions    Ibuprofen Hives, Itching, Rash and Other (See Comments)         Review of Systems       Review of Systems   Constitutional:  Negative for activity change and fatigue.   HENT:  Negative for dental problem and ear pain.    Respiratory:  Negative for shortness of breath.    Cardiovascular:  Negative for chest pain.   Gastrointestinal:  Negative for abdominal pain, constipation, diarrhea and nausea.   Genitourinary:  Negative for dysuria and

## 2024-01-24 NOTE — DISCHARGE INSTRUCTIONS
You were evaluated for medical evaluation .  Based on your work-up it was deemed that she was stable for discharge.  Please follow-up with your primary care physician if you have any further concerns and go over your work-up.  If you experience any chest pain, shortness of breath, worsening abdominal pain, vomiting blood, worsening headache, seizures, or any worsening of your symptoms please return to the emergency department immediately.  If you have any pending results or any further questions please contact the emergency department at [phone]

## 2024-01-26 LAB
BACTERIA SPEC CULT: NORMAL
SERVICE CMNT-IMP: NORMAL